# Patient Record
Sex: FEMALE | Race: BLACK OR AFRICAN AMERICAN | NOT HISPANIC OR LATINO | URBAN - METROPOLITAN AREA
[De-identification: names, ages, dates, MRNs, and addresses within clinical notes are randomized per-mention and may not be internally consistent; named-entity substitution may affect disease eponyms.]

---

## 2023-12-14 ENCOUNTER — APPOINTMENT (OUTPATIENT)
Dept: PRIMARY CARE | Facility: CLINIC | Age: 68
End: 2023-12-14
Payer: COMMERCIAL

## 2023-12-22 ENCOUNTER — TELEPHONE (OUTPATIENT)
Dept: PRIMARY CARE | Facility: CLINIC | Age: 68
End: 2023-12-22

## 2024-01-04 ENCOUNTER — OFFICE VISIT (OUTPATIENT)
Dept: PRIMARY CARE | Facility: CLINIC | Age: 69
End: 2024-01-04
Payer: COMMERCIAL

## 2024-01-04 VITALS
OXYGEN SATURATION: 98 % | WEIGHT: 152.5 LBS | BODY MASS INDEX: 21.88 KG/M2 | HEART RATE: 102 BPM | DIASTOLIC BLOOD PRESSURE: 100 MMHG | SYSTOLIC BLOOD PRESSURE: 182 MMHG

## 2024-01-04 DIAGNOSIS — I10 PRIMARY HYPERTENSION: Primary | ICD-10-CM

## 2024-01-04 DIAGNOSIS — M79.671 PAIN IN BOTH FEET: ICD-10-CM

## 2024-01-04 DIAGNOSIS — E55.9 VITAMIN D DEFICIENCY: ICD-10-CM

## 2024-01-04 DIAGNOSIS — M79.672 PAIN IN BOTH FEET: ICD-10-CM

## 2024-01-04 DIAGNOSIS — R31.9 HEMATURIA, UNSPECIFIED TYPE: ICD-10-CM

## 2024-01-04 DIAGNOSIS — Z12.11 COLON CANCER SCREENING: ICD-10-CM

## 2024-01-04 DIAGNOSIS — E01.0 THYROMEGALY: ICD-10-CM

## 2024-01-04 PROCEDURE — 3080F DIAST BP >= 90 MM HG: CPT | Performed by: STUDENT IN AN ORGANIZED HEALTH CARE EDUCATION/TRAINING PROGRAM

## 2024-01-04 PROCEDURE — 3077F SYST BP >= 140 MM HG: CPT | Performed by: STUDENT IN AN ORGANIZED HEALTH CARE EDUCATION/TRAINING PROGRAM

## 2024-01-04 PROCEDURE — 99214 OFFICE O/P EST MOD 30 MIN: CPT | Performed by: STUDENT IN AN ORGANIZED HEALTH CARE EDUCATION/TRAINING PROGRAM

## 2024-01-04 NOTE — PROGRESS NOTES
Subjective   Patient ID: Nilam Rivers is a 68 y.o. female who saw me a year ago for unintentional weight loss who presents for Follow-up.    HPI  Concerns today:  -Wants handicap placard for her chronic foot pain  -Saw podiatry in June    Chronic issues:  #Weight loss  -Has stabilized, 150s for past year (states she had lost 30 pounds prior to appointment in Dec 2022)  -Labs at that time CRP, ESR, CBC with diff, HIV, TSH reassuring aside from mild elevation in ESR to 43 and 2+ blood on UA with 3 RBC  -CT lung cancer screening with mild to moderate lung emphysema, moderate coronary artery calcification, heterogenous thyroid enlargement, scattered pulm nodules up to 3mm     #HTN  -/100 today  -At last visit, planned to follow up regarding possible blood pressure treatment     #Food insecurity   --yesterday potatoes and onions, BLT, orange juice, coffee, snack on lays poppy  --States housing situation is a mess, leaks in the apartment     Health maintenance:  -Thinking about cologuard  -Declines colonoscopy  -Declines mammogram  -Declines vaccines- had some reaction in the past   -Does not want to continue pap smears, states they have been good in the past      Objective   Visit Vitals  BP (!) 182/100   Pulse 102   Wt 69.2 kg (152 lb 8 oz)   SpO2 98%   BMI 21.88 kg/m²   BSA 1.85 m²      Physical Exam  General: Well appearing, conversational, in no acute distress  HEENT: EOMI, PERRL, nares patent without congestion, MMM  CV: RRR, no murmurs  Resp: Lungs CTAB, normal work of breathing  GI: Soft, nondistended, nontender, BS+   Ext: No lower ext swelling  Skin: Warm, dry, no rashes  Neuro: Awake, alert, oriented x3, moving all 4 extremities, nonfocal, normal gait, ambulates without assistance  Psych: Appropriate mood and affect      Assessment/Plan   Nilam Rivers is a 68 y.o. female who presents for delayed follow-up. Presented last year for unintentional weight loss. Weight has remained stable in the 150s since  last year. Her work-up at that time showed mild elevation in ESR and 2+blood in urine. Will repeat both UA and ESR today.     Patient has very high blood pressure today in the office. I recommended she start a blood pressure medication. She would like to get labs and cut down on salt first and then consider starting a medication in a month. I urged her to start a medication at this time as the degree of elevation is not likely to come down with diet alone. She would like to defer.    Her CT lung cancer screen last year did not show suspicious pulmonary lesions, but did show thyromegaly. Will get dedicated thyroid ultrasound and repeat TSH (normal last year).    Will reach out to patient care navigator regarding resources  Problem List Items Addressed This Visit    None  Visit Diagnoses       Primary hypertension    -  Primary    Relevant Orders    Comprehensive Metabolic Panel    Sedimentation Rate    Lipid Panel Non-Fasting    CBC    Vitamin D 25-Hydroxy,Total (for eval of Vitamin D levels)    Follow Up In [facility] - PCP - Established    Type and screen    Vitamin D deficiency        Relevant Orders    Vitamin D 25-Hydroxy,Total (for eval of Vitamin D levels)    Pain in both feet        Relevant Orders    Disability Placard    Colon cancer screening        Relevant Orders    Cologuard® colon cancer screening    Hematuria, unspecified type        Relevant Orders    Urinalysis with Reflex Microscopic    Thyromegaly        Relevant Orders    TSH with reflex to Free T4 if abnormal    US thyroid                 Genia Lee MD [facility]

## 2024-01-04 NOTE — PATIENT INSTRUCTIONS
Thank you for coming today Nilam!    Please try a low salt diet and exercise. I want to see you in clinic in 4 weeks for blood pressure follow-up.    A cologuard test will be sent to your house.    Please get your blood work done. The lab is on floor 1 in [address] or on [address] in [address].     If your blood pressure is still high in 4 weeks, we discussed possibly starting losartan which helps with blood pressure and protects your kidneys.   188.9

## 2024-01-10 ENCOUNTER — LAB (OUTPATIENT)
Dept: LAB | Facility: LAB | Age: 69
End: 2024-01-10
Payer: COMMERCIAL

## 2024-01-10 DIAGNOSIS — E55.9 VITAMIN D DEFICIENCY: ICD-10-CM

## 2024-01-10 DIAGNOSIS — I10 PRIMARY HYPERTENSION: ICD-10-CM

## 2024-01-10 DIAGNOSIS — E01.0 THYROMEGALY: ICD-10-CM

## 2024-01-10 DIAGNOSIS — R31.9 HEMATURIA, UNSPECIFIED TYPE: ICD-10-CM

## 2024-01-10 PROCEDURE — 81001 URINALYSIS AUTO W/SCOPE: CPT

## 2024-01-10 PROCEDURE — 86900 BLOOD TYPING SEROLOGIC ABO: CPT

## 2024-01-10 PROCEDURE — 36415 COLL VENOUS BLD VENIPUNCTURE: CPT

## 2024-01-10 PROCEDURE — 86850 RBC ANTIBODY SCREEN: CPT

## 2024-01-10 PROCEDURE — 82465 ASSAY BLD/SERUM CHOLESTEROL: CPT

## 2024-01-10 PROCEDURE — 83718 ASSAY OF LIPOPROTEIN: CPT

## 2024-01-10 PROCEDURE — 85652 RBC SED RATE AUTOMATED: CPT

## 2024-01-10 PROCEDURE — 86901 BLOOD TYPING SEROLOGIC RH(D): CPT

## 2024-01-10 PROCEDURE — 85027 COMPLETE CBC AUTOMATED: CPT

## 2024-01-10 PROCEDURE — 82306 VITAMIN D 25 HYDROXY: CPT

## 2024-01-10 PROCEDURE — 84443 ASSAY THYROID STIM HORMONE: CPT

## 2024-01-10 PROCEDURE — 80053 COMPREHEN METABOLIC PANEL: CPT

## 2024-01-11 LAB
25(OH)D3 SERPL-MCNC: 39 NG/ML (ref 30–100)
ABO GROUP (TYPE) IN BLOOD: NORMAL
ALBUMIN SERPL BCP-MCNC: 4.3 G/DL (ref 3.4–5)
ALP SERPL-CCNC: 58 U/L (ref 33–136)
ALT SERPL W P-5'-P-CCNC: 11 U/L (ref 7–45)
ANION GAP SERPL CALC-SCNC: 13 MMOL/L (ref 10–20)
ANTIBODY SCREEN: NORMAL
APPEARANCE UR: CLEAR
AST SERPL W P-5'-P-CCNC: 16 U/L (ref 9–39)
BILIRUB SERPL-MCNC: 0.7 MG/DL (ref 0–1.2)
BILIRUB UR STRIP.AUTO-MCNC: NEGATIVE MG/DL
BUN SERPL-MCNC: 10 MG/DL (ref 6–23)
CALCIUM SERPL-MCNC: 10.2 MG/DL (ref 8.6–10.6)
CHLORIDE SERPL-SCNC: 102 MMOL/L (ref 98–107)
CHOLEST SERPL-MCNC: 231 MG/DL (ref 0–199)
CHOLESTEROL/HDL RATIO: 3.3
CO2 SERPL-SCNC: 29 MMOL/L (ref 21–32)
COLOR UR: YELLOW
CREAT SERPL-MCNC: 0.75 MG/DL (ref 0.5–1.05)
EGFRCR SERPLBLD CKD-EPI 2021: 87 ML/MIN/1.73M*2
ERYTHROCYTE [DISTWIDTH] IN BLOOD BY AUTOMATED COUNT: 14.3 % (ref 11.5–14.5)
ERYTHROCYTE [SEDIMENTATION RATE] IN BLOOD BY WESTERGREN METHOD: 18 MM/H (ref 0–30)
GLUCOSE SERPL-MCNC: 96 MG/DL (ref 74–99)
GLUCOSE UR STRIP.AUTO-MCNC: NEGATIVE MG/DL
HCT VFR BLD AUTO: 40 % (ref 36–46)
HDLC SERPL-MCNC: 69.9 MG/DL
HGB BLD-MCNC: 13.2 G/DL (ref 12–16)
KETONES UR STRIP.AUTO-MCNC: NEGATIVE MG/DL
LEUKOCYTE ESTERASE UR QL STRIP.AUTO: ABNORMAL
MCH RBC QN AUTO: 28.5 PG (ref 26–34)
MCHC RBC AUTO-ENTMCNC: 33 G/DL (ref 32–36)
MCV RBC AUTO: 86 FL (ref 80–100)
NITRITE UR QL STRIP.AUTO: NEGATIVE
NON-HDL CHOLESTEROL: 161 MG/DL (ref 0–149)
NRBC BLD-RTO: 0 /100 WBCS (ref 0–0)
PH UR STRIP.AUTO: 6 [PH]
PLATELET # BLD AUTO: 230 X10*3/UL (ref 150–450)
POTASSIUM SERPL-SCNC: 4.3 MMOL/L (ref 3.5–5.3)
PROT SERPL-MCNC: 7.5 G/DL (ref 6.4–8.2)
PROT UR STRIP.AUTO-MCNC: NEGATIVE MG/DL
RBC # BLD AUTO: 4.63 X10*6/UL (ref 4–5.2)
RBC # UR STRIP.AUTO: ABNORMAL /UL
RBC #/AREA URNS AUTO: ABNORMAL /HPF
RH FACTOR (ANTIGEN D): NORMAL
SODIUM SERPL-SCNC: 140 MMOL/L (ref 136–145)
SP GR UR STRIP.AUTO: 1.01
SQUAMOUS #/AREA URNS AUTO: ABNORMAL /HPF
TSH SERPL-ACNC: 0.93 MIU/L (ref 0.44–3.98)
UROBILINOGEN UR STRIP.AUTO-MCNC: <2 MG/DL
WBC # BLD AUTO: 4.4 X10*3/UL (ref 4.4–11.3)
WBC #/AREA URNS AUTO: ABNORMAL /HPF

## 2024-01-25 ENCOUNTER — OFFICE VISIT (OUTPATIENT)
Dept: PRIMARY CARE | Facility: CLINIC | Age: 69
End: 2024-01-25
Payer: COMMERCIAL

## 2024-01-25 ENCOUNTER — DOCUMENTATION (OUTPATIENT)
Dept: PRIMARY CARE | Facility: CLINIC | Age: 69
End: 2024-01-25

## 2024-01-25 VITALS
BODY MASS INDEX: 22.1 KG/M2 | DIASTOLIC BLOOD PRESSURE: 100 MMHG | HEART RATE: 103 BPM | WEIGHT: 154 LBS | SYSTOLIC BLOOD PRESSURE: 168 MMHG | OXYGEN SATURATION: 98 %

## 2024-01-25 DIAGNOSIS — M79.672 PAIN IN BOTH FEET: Primary | ICD-10-CM

## 2024-01-25 DIAGNOSIS — M79.671 PAIN IN BOTH FEET: Primary | ICD-10-CM

## 2024-01-25 PROCEDURE — 99213 OFFICE O/P EST LOW 20 MIN: CPT | Performed by: STUDENT IN AN ORGANIZED HEALTH CARE EDUCATION/TRAINING PROGRAM

## 2024-01-25 NOTE — PATIENT INSTRUCTIONS
Thank you for coming today Nilam!    Please go to the lower level [address] to get your ultrasound of your thyroid.    Davida is going to call you today. She is the patient care navigator.     I will see you to follow up on your blood pressure!

## 2024-01-25 NOTE — PROGRESS NOTES
Subjective   Patient ID: Nilam Rivers is a 69 y.o. female with uncontrolled HTN who presents to discuss thyroid ultrasound that was ordered.     HPI  Concerns today:  #Handicap placard  -Patient upset that handicap placard was only done for a year instead of longer    #Thyroid ultrasound  -Patient would like to discuss thyroid ultrasound  -CT lung cancer screen 1/4/23 showed heterogeneous enlargement of the thyroid gland with suggestion of multiple nodules-- recommended correlation with thyroid US  -TSH normal     --CT also showed coronary calcifications-- would recommend statin    Chronic issues:  #Weight loss  -Has stabilized, 150s for past year (states she had lost 30 pounds prior to appointment in Dec 2022)  -Labs at that time CRP, ESR, CBC with diff, HIV, TSH reassuring aside from mild  elevation in ESR to 43 and 2+ blood on UA with 3 RBC  -CT lung cancer screening with mild to moderate lung emphysema, moderate coronary artery calcification, heterogenous thyroid enlargement, scattered pulm nodules up to 3mm     #HTN  -/100 last visit, 168/100 today   -Declines medication      #Food insecurity     Health maintenance:  -Thinking about cologuard  -Declines colonoscopy  -Declines mammogram  -Declines vaccines- had some reaction in the past   -Does not want to continue pap smears, states they have been good in the past      Objective   Visit Vitals  BP (!) 168/100   Pulse 103   Wt 69.9 kg (154 lb)   SpO2 98%   BMI 22.10 kg/m²   BSA 1.86 m²      Physical Exam  General: Well appearing, conversational, in no acute distress  HEENT: EOMI, PERRL, nares patent without congestion, MMM  Neck: No palpable thyromegaly   CV: RRR, no murmurs  Resp: Lungs CTAB, normal work of breathing  GI: Soft, nondistended, nontender, BS+   Ext: No lower ext swelling  Skin: Warm, dry, no rashes  Neuro: Awake, alert, oriented x3, moving all 4 extremities, nonfocal, normal gait, ambulates without assistance  Psych: Appropriate mood and  affect      Assessment/Plan   Nilam Rivers is a 69 y.o. female with uncontrolled HTN who presents to discuss thyroid ultrasound that was ordered.    -Recommended thyroid ultrasound due to CT showing heterogeneous thyroid with multiple nodules and her history of weight loss  -Also recommend BP medication--patient defers to next visit  -Also recommend statin due to calcifications of coronaries--patient defers       Will reach out to patient care navigator regarding resources  Problem List Items Addressed This Visit    None  Visit Diagnoses       Pain in both feet    -  Primary    Relevant Orders    Disability Placard               Genia Lee MD [facility]

## 2024-02-06 ENCOUNTER — APPOINTMENT (OUTPATIENT)
Dept: PRIMARY CARE | Facility: CLINIC | Age: 69
End: 2024-02-06
Payer: COMMERCIAL

## 2024-02-07 ENCOUNTER — TELEPHONE (OUTPATIENT)
Dept: PRIMARY CARE | Facility: CLINIC | Age: 69
End: 2024-02-07
Payer: COMMERCIAL

## 2024-02-08 ENCOUNTER — DOCUMENTATION (OUTPATIENT)
Dept: PRIMARY CARE | Facility: CLINIC | Age: 69
End: 2024-02-08
Payer: COMMERCIAL

## 2024-02-08 LAB — NONINV COLON CA DNA+OCC BLD SCRN STL QL: NORMAL
